# Patient Record
Sex: MALE | Race: BLACK OR AFRICAN AMERICAN | Employment: UNEMPLOYED | URBAN - METROPOLITAN AREA
[De-identification: names, ages, dates, MRNs, and addresses within clinical notes are randomized per-mention and may not be internally consistent; named-entity substitution may affect disease eponyms.]

---

## 2017-02-06 ENCOUNTER — OFFICE VISIT (OUTPATIENT)
Dept: FAMILY MEDICINE CLINIC | Age: 32
End: 2017-02-06

## 2017-02-06 VITALS
OXYGEN SATURATION: 96 % | BODY MASS INDEX: 36.17 KG/M2 | SYSTOLIC BLOOD PRESSURE: 150 MMHG | RESPIRATION RATE: 12 BRPM | HEART RATE: 72 BPM | WEIGHT: 297 LBS | TEMPERATURE: 97.9 F | HEIGHT: 76 IN | DIASTOLIC BLOOD PRESSURE: 92 MMHG

## 2017-02-06 DIAGNOSIS — E66.9 OBESITY (BMI 30-39.9): ICD-10-CM

## 2017-02-06 DIAGNOSIS — Z13.29 SCREENING FOR ENDOCRINE, METABOLIC AND IMMUNITY DISORDER: ICD-10-CM

## 2017-02-06 DIAGNOSIS — I48.92 ATRIAL FLUTTER, UNSPECIFIED TYPE (HCC): ICD-10-CM

## 2017-02-06 DIAGNOSIS — Z99.89 OSA ON CPAP: ICD-10-CM

## 2017-02-06 DIAGNOSIS — Z13.228 SCREENING FOR ENDOCRINE, METABOLIC AND IMMUNITY DISORDER: ICD-10-CM

## 2017-02-06 DIAGNOSIS — E11.9 CONTROLLED TYPE 2 DIABETES MELLITUS WITHOUT COMPLICATION, WITHOUT LONG-TERM CURRENT USE OF INSULIN (HCC): ICD-10-CM

## 2017-02-06 DIAGNOSIS — Z13.0 SCREENING FOR ENDOCRINE, METABOLIC AND IMMUNITY DISORDER: ICD-10-CM

## 2017-02-06 DIAGNOSIS — I50.20 SYSTOLIC CONGESTIVE HEART FAILURE WITH REDUCED LEFT VENTRICULAR FUNCTION, NYHA CLASS 4 (HCC): Primary | ICD-10-CM

## 2017-02-06 DIAGNOSIS — Z00.00 ROUTINE GENERAL MEDICAL EXAMINATION AT A HEALTH CARE FACILITY: ICD-10-CM

## 2017-02-06 DIAGNOSIS — G47.33 OSA ON CPAP: ICD-10-CM

## 2017-02-06 LAB
BILIRUB UR QL STRIP: NEGATIVE
GLUCOSE UR-MCNC: NEGATIVE MG/DL
KETONES P FAST UR STRIP-MCNC: NEGATIVE MG/DL
PH UR STRIP: 5.5 [PH] (ref 4.6–8)
PROT UR QL STRIP: NEGATIVE MG/DL
SP GR UR STRIP: 1.01 (ref 1–1.03)
UA UROBILINOGEN AMB POC: NORMAL (ref 0.2–1)
URINALYSIS CLARITY POC: NORMAL
URINALYSIS COLOR POC: NORMAL
URINE BLOOD POC: NEGATIVE
URINE LEUKOCYTES POC: NEGATIVE
URINE NITRITES POC: NEGATIVE

## 2017-02-06 RX ORDER — WARFARIN SODIUM 5 MG/1
10 TABLET ORAL DAILY
Qty: 60 TAB | Refills: 0 | Status: SHIPPED | OUTPATIENT
Start: 2017-02-06 | End: 2017-03-21 | Stop reason: SDUPTHER

## 2017-02-06 RX ORDER — SPIRONOLACTONE 25 MG/1
25 TABLET ORAL DAILY
Qty: 30 TAB | Refills: 5 | Status: SHIPPED | OUTPATIENT
Start: 2017-02-06

## 2017-02-06 NOTE — PROGRESS NOTES
1. Have you been to the ER, urgent care clinic since your last visit? Hospitalized since your last visit? No    2. Have you seen or consulted any other health care providers outside of the [address] [place] [address] since your last visit? Include any pap smears or colon screening.  No     Chief Complaint   Patient presents with    Complete Physical     needs referral to cardio    Medication Refill

## 2017-02-06 NOTE — MR AVS SNAPSHOT
Visit Information Date & Time Provider Department Dept. Phone Encounter #  
 2/6/2017  9:00 AM Angelo Weston, [address] [facility] [phone] [id number] Follow-up Instructions Return in about 4 months (around 6/6/2017) for diabetes. Upcoming Health Maintenance Date Due DTaP/Tdap/Td series (1 - Tdap) 7/31/2008 INFLUENZA AGE 9 TO ADULT 8/1/2016 HEMOGLOBIN A1C Q6M 10/26/2016 EYE EXAM RETINAL OR DILATED Q1 5/20/2017 FOOT EXAM Q1 6/3/2017 MICROALBUMIN Q1 6/3/2017 LIPID PANEL Q1 6/3/2017 Allergies as of 2/6/2017  Review Complete On: 2/6/2017 By: Angelo Weston NP Severity Noted Reaction Type Reactions Cherry Flavor  04/20/2016    Swelling Facial swelling Current Immunizations  Never Reviewed Name Date Pneumococcal Polysaccharide (PPSV-23) 6/3/2016 Not reviewed this visit You Were Diagnosed With   
  
 Codes Comments Systolic congestive heart failure with reduced left ventricular function, NYHA class 4 (HCC)    -  Primary ICD-10-CM: I50.20 ICD-9-CM: 428.20, 428.0 Controlled type 2 diabetes mellitus without complication, without long-term current use of insulin ([facility]     ICD-10-CM: E11.9 ICD-9-CM: 250.00 Atrial flutter, unspecified type ([facility]     ICD-10-CM: I48.92 
ICD-9-CM: 427.32 Obesity (BMI 30-39. 9)     ICD-10-CM: E66.9 ICD-9-CM: 278.00 AIMEE on CPAP     ICD-10-CM: G47.33 
ICD-9-CM: 327.23 Screening for endocrine, metabolic and immunity disorder     ICD-10-CM: Z13.29, Z13.228, Z13.0 ICD-9-CM: V77.99 Vitals BP Pulse Temp Resp Height(growth percentile) Weight(growth percentile) (!) 150/92 (BP 1 Location: Left arm, BP Patient Position: Sitting) 72 97.9 °F (36.6 °C) (Oral) 12 6' 4\" (1.93 m) 297 lb (134.7 kg) SpO2 BMI Smoking Status 96% 36.15 kg/m2 Never Smoker Vitals History BMI and BSA Data Body Mass Index Body Surface Area 36.15 kg/m 2 2.69 m 2 Preferred Pharmacy Pharmacy Name Phone [facility] [address] [facility] [phone] Your Updated Medication List  
  
   
This list is accurate as of: 2/6/17  9:11 AM.  Always use your most recent med list.  
  
  
  
  
 amiodarone 200 mg tablet Commonly known as:  CORDARONE  
TAKE ONE TABLET BY MOUTH ONCE DAILY *START AFTER THE TWICE DAILY PRESCRIPTION IS COMPLETED*  
  
 bumetanide 0.5 mg tablet Commonly known as:  Bharath Ricky Take 1 Tab by mouth daily. Indications: ACUTE DECOMPENSATED HEART FAILURE  
  
 carvedilol 6.25 mg tablet Commonly known as:  Sal Marshal Take 1 Tab by mouth two (2) times daily (with meals). Indications: CHRONIC HEART FAILURE  
  
 digoxin 0.125 mg tablet Commonly known as:  LANOXIN  
TAKE ONE TABLET BY MOUTH ONCE DAILY FOR VENTRICULAR RATE CONTROL IN ATRIAL FIBRILLATION  
  
 lisinopril 10 mg tablet Commonly known as:  Ladonna Reasons Take 1 Tab by mouth daily. metFORMIN  mg tablet Commonly known as:  GLUCOPHAGE XR Take 1 Tab by mouth daily (with breakfast). CHANGE OF THERAPY  
  
 spironolactone 25 mg tablet Commonly known as:  ALDACTONE Take 1 Tab by mouth daily. Indications: Chronic Heart Failure  
  
 warfarin 5 mg tablet Commonly known as:  COUMADIN Take 2 Tabs by mouth daily. Prescriptions Sent to Pharmacy Refills  
 warfarin (COUMADIN) 5 mg tablet 0 Sig: Take 2 Tabs by mouth daily. Class: Normal  
 Pharmacy: [address] Ph #: [phone] Route: Oral  
 spironolactone (ALDACTONE) 25 mg tablet 5 Sig: Take 1 Tab by mouth daily. Indications: Chronic Heart Failure Class: Normal  
 Pharmacy: [place] [id number], [employer] Ph #: [phone] Route: Oral  
  
We Performed the Following CBC W/O DIFF [64604 CPT(R)] HEMOGLOBIN A1C WITH EAG [05276 CPT(R)] METABOLIC PANEL, COMPREHENSIVE [78527 CPT(R)] MICROALBUMIN, UR, RAND W/ MICROALBUMIN/CREA RATIO [id number] CPT(R)] PROTHROMBIN TIME + INR [56862 CPT(R)] REFERRAL TO CARDIOLOGY [JMQ80 Custom] Comments:  
 Please evaluate patient for atrial flutter. T3 TOTAL [id number] CPT(R)] TSH AND FREE T4 [78464 CPT(R)] Follow-up Instructions Return in about 4 months (around 6/6/2017) for diabetes. Referral Information Referral ID Referred By Referred To  
  
 [id number] [facility] [facility]o [address] Visits Status Start Date End Date 1 New Request 2/6/17 2/6/18 If your referral has a status of pending review or denied, additional information will be sent to support the outcome of this decision. Patient Instructions Nutrition Tips for Diabetes: After Your Visit Your Care Instructions A healthy diet is important to manage diabetes. It helps you lose weight (if you need to) and keep it off. It gives you the nutrition and energy your body needs and helps prevent heart disease. But a diet for diabetes does not mean that you have to eat special foods. You can eat what your family eats, including occasional sweets and other favorites. But you do have to pay attention to how often you eat and how much you eat of certain foods. The right plan for you will give you meals that help you keep your blood sugar at healthy levels. Try to eat a variety of foods and to spread carbohydrate throughout the day. Carbohydrate raises blood sugar higher and more quickly than any other nutrient does. Carbohydrate is found in sugar, breads and cereals, fruit, starchy vegetables such as potatoes and corn, and milk and yogurt. You may want to work with a dietitian or diabetes educator to help you plan meals and snacks.  A dietitian or diabetes educator also can help you lose weight if that is one of your goals. The following tips can help you enjoy your meals and stay healthy. Follow-up care is a key part of your treatment and safety. Be sure to make and go to all appointments, and call your doctor if you are having problems. Its also a good idea to know your test results and keep a list of the medicines you take. How can you care for yourself at home? · Learn which foods have carbohydrate and how much carbohydrate to eat. A dietitian or diabetes educator can help you learn to keep track of how much carbohydrate you eat. · Spread carbohydrate throughout the day. Eat some carbohydrate at all meals, but do not eat too much at any one time. · Plan meals to include food from all the food groups. These are the food groups and some example portion sizes: ¨ Grains: 1 slice of bread (1 ounce), ½ cup of cooked cereal, and 1/3 cup of cooked pasta or rice. These have about 15 grams of carbohydrate in a serving. Choose whole grains such as whole wheat bread or crackers, oatmeal, and brown rice more often than refined grains. ¨ Fruit: 1 small fresh fruit, such as an apple or orange; ½ of a banana; ½ cup of chopped, cooked, or canned fruit; ½ cup of fruit juice; 1 cup of melon or raspberries; and 2 tablespoons of dried fruit. These have about 15 grams of carbohydrate in a serving. ¨ Dairy: 1 cup of nonfat or low-fat milk and 2/3 cup of plain yogurt. These have about 15 grams of carbohydrate in a serving. ¨ Protein foods: Beef, chicken, turkey, fish, eggs, tofu, cheese, cottage cheese, and peanut butter. A serving size of meat is 3 ounces, which is about the size of a deck of cards. Examples of meat substitute serving sizes (equal to 1 ounce of meat) are 1/4 cup of cottage cheese, 1 egg, 1 tablespoon of peanut butter, and ½ cup of tofu. These have very little or no carbohydrate per serving.  
¨ Vegetables: Starchy vegetables such as ½ cup of cooked dried beans, peas, potatoes, or corn have about 15 grams of carbohydrate. Nonstarchy vegetables have very little carbohydrate, such as 1 cup of raw leafy vegetables (such as spinach), ½ cup of other vegetables (cooked or chopped), and 3/4 cup of vegetable juice. · Use the plate format to plan meals. It is a good, quick way to make sure that you have a balanced meal. It also helps you spread carbohydrate throughout the day. You divide your plate by types of foods. Put vegetables on half the plate, meat or meat substitutes on one-quarter of the plate, and a grain or starchy vegetable (such as brown rice or a potato) in the final quarter of the plate. To this you can add a small piece of fruit and 1 cup of milk or yogurt, depending on how much carbohydrate you are supposed to eat at a meal. 
· Talk to your dietitian or diabetes educator about ways to add limited amounts of sweets into your meal plan. You can eat these foods now and then, as long as you include the amount of carbohydrate they have in your daily carbohydrate allowance. · If you drink alcohol, limit it to no more than 1 drink a day for women and 2 drinks a day for men. If you are pregnant, no amount of alcohol is known to be safe. · Protein, fat, and fiber do not raise blood sugar as much as carbohydrate does. If you eat a lot of these nutrients in a meal, your blood sugar will rise more slowly than it would otherwise. · Limit saturated fats, such as those from meat and dairy products. Try to replace it with monounsaturated fat, such as olive oil. This is a healthier choice because people who have diabetes are at higher-than-average risk of heart disease. But use a modest amount of olive oil. A tablespoon of olive oil has 14 grams of fat and 120 calories. · Exercise lowers blood sugar. If you take insulin by shots or pump, you can use less than you would if you were not exercising.  Keep in mind that timing matters. If you exercise within 1 hour after a meal, your body may need less insulin for that meal than it would if you exercised 3 hours after the meal. Test your blood sugar to find out how exercise affects your need for insulin. · Exercise on most days of the week. Aim for at least 30 minutes. Exercise helps you stay at a healthy weight and helps your body use insulin. Walking is an easy way to get exercise. Gradually increase the amount you walk every day. You also may want to swim, bike, or do other activities. When you eat out · Learn to estimate the serving sizes of foods that have carbohydrate. If you measure food at home, it will be easier to estimate the amount in a serving of restaurant food. · If the meal you order has too much carbohydrate (such as potatoes, corn, or baked beans), ask to have a low-carbohydrate food instead. Ask for a salad or green vegetables. · If you use insulin, check your blood sugar before and after eating out to help you plan how much to eat in the future. · If you eat more carbohydrate at a meal than you had planned, take a walk or do other exercise. This will help lower your blood sugar. Where can you learn more? Go to [employer] Enter B765 in the search box to learn more about \"Nutrition Tips for Diabetes: After Your Visit. \"  
© 8788-9456 [employer]wise, Incorporated. Care instructions adapted under license by [facility] [employer] (which disclaims liability or warranty for this information). This care instruction is for use with your licensed healthcare professional. If you have questions about a medical condition or this instruction, always ask your healthcare professional. [place] [address] any warranty or liability for your use of this information. Content Version: 85.8.865760; Current as of: June 4, 2014 Introducing \[facility]\"" & HEALTH SERVICES! Dear Dilia Castillo: Thank you for requesting a [employer] account. Our records indicate that you already have an active [employer] account. You can access your account anytime at [email or web address]"[employer]. [employer]/"[employer] Did you know that you can access your hospital and ER discharge instructions at any time in [employer]? You can also review all of your test results from your hospital stay or ER visit. Additional Information If you have questions, please visit the Frequently Asked Questions section of the [employer] website at [email or web address]"[employer]. [employer]/. Remember, [employer] is NOT to be used for urgent needs. For medical emergencies, dial 911. Now available from your iPhone and Android! Please provide this summary of care documentation to your next provider. Your primary care clinician is listed as Leatha Montague. If you have any questions after today's visit, please call [phone].

## 2017-02-06 NOTE — PROGRESS NOTES
HISTORY OF PRESENT ILLNESS  Lawernce Kaska Marylen Dawn. is a 36 y.o. male. HPI  Cardiovascular Review:  The patient has systolic HF, NICM-EF 90%, atrial flutter/AF, HTN, AIMEE (using CPAP) and obesity. Seen by cardiology, Dr. Brooklyn Morgan and Dr. Chayito Unger routinely. Patient had a an AICD placed in 11/2016. Diet and Lifestyle: generally follows a low fat low cholesterol diet, generally follows a low sodium diet, exercises regularly, nonsmoker  Home BP Monitoring: is well controlled at home, ranging 120's/60's. Pertinent ROS: taking medications as instructed, no medication side effects noted, no TIA's, no chest pain on exertion, notes stable dyspnea on exertion, no change, no swelling of ankles. Anticoagulation  Patient here for followup of chronic anticoagulation for Atrial Fibrillation. Bleeding Signs/Symptoms:  None. Thromboembolic Signs/Symptoms:  None    Taking 10 mg 4 days a week and 7.5 mg 3 days a week. Managed by cardiology. Diabetes Mellitus:  Diabetic ROS - medication compliance: no medications at present, diabetic diet compliance: compliant most of the time, home glucose monitoring: is not performed, further diabetic ROS: no polyuria or polydipsia, no chest pain, dyspnea or TIA's, no numbness, tingling or pain in extremities. Current Outpatient Prescriptions   Medication Sig Dispense Refill    warfarin (COUMADIN) 5 mg tablet Take 2 Tabs by mouth daily. 60 Tab 0    spironolactone (ALDACTONE) 25 mg tablet Take 1 Tab by mouth daily. Indications: Chronic Heart Failure 30 Tab 5    digoxin (LANOXIN) 0.125 mg tablet TAKE ONE TABLET BY MOUTH ONCE DAILY FOR VENTRICULAR RATE CONTROL IN ATRIAL FIBRILLATION 30 Tab 1    amiodarone (CORDARONE) 200 mg tablet TAKE ONE TABLET BY MOUTH ONCE DAILY *START AFTER THE TWICE DAILY PRESCRIPTION IS COMPLETED* 30 Tab 1    lisinopril (PRINIVIL, ZESTRIL) 10 mg tablet Take 1 Tab by mouth daily.  30 Tab 2    metFORMIN ER (GLUCOPHAGE XR) 500 mg tablet Take 1 Tab by mouth daily (with breakfast). CHANGE OF THERAPY 90 Tab 3    bumetanide (BUMEX) 0.5 mg tablet Take 1 Tab by mouth daily. Indications: ACUTE DECOMPENSATED HEART FAILURE 30 Tab 3    carvedilol (COREG) 6.25 mg tablet Take 1 Tab by mouth two (2) times daily (with meals). Indications: CHRONIC HEART FAILURE 60 Tab 3     Past Medical History   Diagnosis Date    Asthma     [facility] 4/6/2016     MRI: 4/6/2016: LV dilated, global HK, LVH-severe, LVEF 8%, RVEF 45%, no MI, infiltrative or inflammatory disease     Hypertension     Obesity     AIMEE (obstructive sleep apnea)     PAF (paroxysmal atrial fibrillation) ([facility]      discovered June 2015; Dr. Matthew Arreguin Systolic congestive heart failure with reduced left ventricular function, NYHA class 3 ([facility] 4/6/2016     admission 4/4/16      Past Surgical History   Procedure Laterality Date    Hx orthopaedic       left knee    Hx implantable cardioverter defibrillator  11/2016     Dr. Weldon Stage      Lab Results   Component Value Date/Time    Hemoglobin A1c 6.7 04/26/2016 04:47 PM    Hemoglobin A1c 6.6 04/21/2016 11:21 AM    Glucose 98 06/03/2016 10:45 AM    LDL, calculated 76 06/03/2016 10:45 AM    Creatinine 1.02 06/03/2016 10:45 AM      Lab Results   Component Value Date/Time    Cholesterol, total 139 06/03/2016 10:45 AM    HDL Cholesterol 44 06/03/2016 10:45 AM    LDL, calculated 76 06/03/2016 10:45 AM    Triglyceride 97 06/03/2016 10:45 AM          Review of Systems   Constitutional: Negative for malaise/fatigue and weight loss. Respiratory: Negative for shortness of breath. Cardiovascular: Negative for chest pain, palpitations and leg swelling. Gastrointestinal: Negative for heartburn. Musculoskeletal: Negative for back pain, joint pain and myalgias. Neurological: Negative for dizziness, weakness and headaches. Psychiatric/Behavioral: Negative for depression. Physical Exam   Constitutional: He is oriented to person, place, and time.  He appears well-developed and well-nourished. No distress. HENT:   Right Ear: Tympanic membrane and ear canal normal.   Left Ear: Tympanic membrane and ear canal normal.   Nose: No mucosal edema. Right sinus exhibits no maxillary sinus tenderness and no frontal sinus tenderness. Left sinus exhibits no maxillary sinus tenderness and no frontal sinus tenderness. Mouth/Throat: Oropharynx is clear and moist.   Eyes: Conjunctivae and EOM are normal. Pupils are equal, round, and reactive to light. Neck: Normal range of motion. Neck supple. No JVD present. Carotid bruit is not present. No thyromegaly present. Cardiovascular: Normal rate, regular rhythm, normal heart sounds and intact distal pulses. Exam reveals no gallop and no friction rub. No murmur heard. Pulmonary/Chest: Effort normal and breath sounds normal. No respiratory distress. He has no decreased breath sounds. He has no wheezes. He has no rhonchi. Abdominal: Normal appearance and bowel sounds are normal. There is no tenderness. There is no rigidity and no guarding. Hernia confirmed negative in the right inguinal area and confirmed negative in the left inguinal area. Genitourinary: Testes normal and penis normal.   Musculoskeletal: He exhibits no edema. Lymphadenopathy:     He has no cervical adenopathy. Neurological: He is alert and oriented to person, place, and time. He has normal strength. No cranial nerve deficit. Gait normal.   Skin: No rash noted. Psychiatric: He has a normal mood and affect. His behavior is normal.   Nursing note and vitals reviewed. ASSESSMENT and PLAN  Nain Doherty was seen today for complete physical and medication refill. Diagnoses and all orders for this visit:    Systolic congestive heart failure with reduced left ventricular function, NYHA class 4 (HCC)  Stable, no changes to current therapy Managed by cardiology.  Continue home monitoring and call for reading >140/90  -     REFERRAL TO CARDIOLOGY    Controlled type 2 diabetes mellitus without complication, without long-term current use of insulin ([address]  Well controlled, no medication changes.  -     METABOLIC PANEL, COMPREHENSIVE  -     HEMOGLOBIN A1C WITH EAG  -     MICROALBUMIN, UR, RAND W/ MICROALBUMIN/CREA RATIO  -     LIPID PANEL    Atrial flutter, unspecified type ([address]  Check INR today. Patient to follow up with cardiology for management. -     warfarin (COUMADIN) 5 mg tablet; Take 2 Tabs by mouth daily. -     spironolactone (ALDACTONE) 25 mg tablet; Take 1 Tab by mouth daily. Indications: Chronic Heart Failure  -     REFERRAL TO CARDIOLOGY  -     PROTHROMBIN TIME + INR    Obesity (BMI 30-39. 9)  Discussed need for weight loss through diet and exercise. Reviewed decreased caloric intake and increased activity. AIMEE on CPAP  Stable, no changes to current therapy    Screening for endocrine, metabolic and immunity disorder  Check thyroid given chronic Amiodarone use. -     CBC W/O DIFF  -     TSH AND FREE T4  -     T3 TOTAL    I have discussed the diagnosis with the patient and the intended plan as seen in the above orders. The patient has received an after-visit summary and questions were answered concerning future plans. I have discussed medication side effects and warnings with the patient as well. Follow-up Disposition:  Return in about 4 months (around 6/6/2017) for diabetes.

## 2017-02-06 NOTE — PATIENT INSTRUCTIONS

## 2017-02-07 LAB
ALBUMIN SERPL-MCNC: 4.2 G/DL (ref 3.5–5.5)
ALBUMIN/CREAT UR: <5 MG/G CREAT (ref 0–30)
ALBUMIN/GLOB SERPL: 1.5 {RATIO} (ref 1.1–2.5)
ALP SERPL-CCNC: 51 IU/L (ref 39–117)
ALT SERPL-CCNC: 13 IU/L (ref 0–44)
AST SERPL-CCNC: 18 IU/L (ref 0–40)
BILIRUB SERPL-MCNC: 0.2 MG/DL (ref 0–1.2)
BUN SERPL-MCNC: 12 MG/DL (ref 6–20)
BUN/CREAT SERPL: 12 (ref 8–19)
CALCIUM SERPL-MCNC: 9.1 MG/DL (ref 8.7–10.2)
CHLORIDE SERPL-SCNC: 100 MMOL/L (ref 96–106)
CHOLEST SERPL-MCNC: 139 MG/DL (ref 100–199)
CO2 SERPL-SCNC: 23 MMOL/L (ref 18–29)
CREAT SERPL-MCNC: 1.02 MG/DL (ref 0.76–1.27)
CREAT UR-MCNC: 59.6 MG/DL
ERYTHROCYTE [DISTWIDTH] IN BLOOD BY AUTOMATED COUNT: 14.9 % (ref 12.3–15.4)
EST. AVERAGE GLUCOSE BLD GHB EST-MCNC: 137 MG/DL
GLOBULIN SER CALC-MCNC: 2.8 G/DL (ref 1.5–4.5)
GLUCOSE SERPL-MCNC: 96 MG/DL (ref 65–99)
HBA1C MFR BLD: 6.4 % (ref 4.8–5.6)
HCT VFR BLD AUTO: 37.9 % (ref 37.5–51)
HDLC SERPL-MCNC: 48 MG/DL
HGB BLD-MCNC: 12.3 G/DL (ref 12.6–17.7)
INTERPRETATION, 910389: NORMAL
LDLC SERPL CALC-MCNC: 81 MG/DL (ref 0–99)
MCH RBC QN AUTO: 27 PG (ref 26.6–33)
MCHC RBC AUTO-ENTMCNC: 32.5 G/DL (ref 31.5–35.7)
MCV RBC AUTO: 83 FL (ref 79–97)
MICROALBUMIN UR-MCNC: <3 UG/ML
PLATELET # BLD AUTO: 175 X10E3/UL (ref 150–379)
POTASSIUM SERPL-SCNC: 4.5 MMOL/L (ref 3.5–5.2)
PROT SERPL-MCNC: 7 G/DL (ref 6–8.5)
RBC # BLD AUTO: 4.56 X10E6/UL (ref 4.14–5.8)
SODIUM SERPL-SCNC: 140 MMOL/L (ref 134–144)
T3 SERPL-MCNC: 105 NG/DL (ref 71–180)
T4 FREE SERPL-MCNC: 1.81 NG/DL (ref 0.82–1.77)
TRIGL SERPL-MCNC: 50 MG/DL (ref 0–149)
TSH SERPL DL<=0.005 MIU/L-ACNC: 1 UIU/ML (ref 0.45–4.5)
VLDLC SERPL CALC-MCNC: 10 MG/DL (ref 5–40)
WBC # BLD AUTO: 4.8 X10E3/UL (ref 3.4–10.8)

## 2017-02-09 ENCOUNTER — TELEPHONE (OUTPATIENT)
Dept: FAMILY MEDICINE CLINIC | Age: 32
End: 2017-02-09

## 2017-02-09 NOTE — TELEPHONE ENCOUNTER
Referral Request Telephone Call      Insurance Name:     Colby Mitchell    Insurance ID:   [id number]   Specialist Name: Dr. Kim Ram   Type of Specialty:  Cardiologist    Address of Specialist:  [address] [place] [address]   [address], [address]   Phone/Fax Number of Specialist: Phone:  [phone]  Fax: [phone]     Diagnosis: Chest pain   Appointment Date: 01/20/17   NPI    Tax ID      PATIENT HAS UPDATED HIS PCP TO Tatum Jon.

## 2017-03-15 DIAGNOSIS — I48.92 ATRIAL FLUTTER, UNSPECIFIED TYPE (HCC): ICD-10-CM

## 2017-03-15 RX ORDER — WARFARIN SODIUM 5 MG/1
10 TABLET ORAL DAILY
Qty: 60 TAB | Refills: 5 | OUTPATIENT
Start: 2017-03-15

## 2017-03-15 NOTE — TELEPHONE ENCOUNTER
Warfarin is managed by cardiologist. Refill request should go to them as I don't know patient's current dosing.

## 2017-03-15 NOTE — TELEPHONE ENCOUNTER
Contact # is [phone]    Patient is requesting a refill on medication:  Requested Prescriptions     Pending Prescriptions Disp Refills    warfarin (COUMADIN) 5 mg tablet 60 Tab 0     Sig: Take 2 Tabs by mouth daily.      Pharmacy verified

## 2022-03-18 PROBLEM — E66.9 OBESITY (BMI 30-39.9): Status: ACTIVE | Noted: 2017-02-06

## 2022-03-19 PROBLEM — E11.9 CONTROLLED TYPE 2 DIABETES MELLITUS WITHOUT COMPLICATION, WITHOUT LONG-TERM CURRENT USE OF INSULIN (HCC): Status: ACTIVE | Noted: 2017-02-06
